# Patient Record
Sex: MALE | Race: OTHER | Employment: OTHER | URBAN - METROPOLITAN AREA
[De-identification: names, ages, dates, MRNs, and addresses within clinical notes are randomized per-mention and may not be internally consistent; named-entity substitution may affect disease eponyms.]

---

## 2018-07-25 NOTE — PATIENT DISCUSSION
"""S/P IOL OS: Tecwilliam  ZKB00 21 (ORA) +ORA/Femto +TM. Continue post operative instructions and drops per schedule.  """

## 2018-08-13 NOTE — PATIENT DISCUSSION
"""S/P IOL OD: Tecnis  ZKB00 21 +Femto +TM. Continue post operative instructions and drops per schedule.  """

## 2018-09-07 NOTE — PATIENT DISCUSSION
"""S/P IOL OU: OD: Tecnis  ZKB00 21Femto +TM. OS: Tecnis  ZKB00 21 (ORA) +ORA +TM. MRx given today. "

## 2018-12-10 NOTE — PATIENT DISCUSSION
"""recommend patient increase artificial tears to qid for dry sensation and recommend patient use ""

## 2023-04-12 ENCOUNTER — POST-OP (OUTPATIENT)
Dept: URBAN - METROPOLITAN AREA CLINIC 14 | Facility: CLINIC | Age: 63
End: 2023-04-12

## 2023-04-12 DIAGNOSIS — Z96.1: ICD-10-CM

## 2023-04-12 PROCEDURE — 99024NOCM NON COMANAGED POST OP CARE

## 2023-04-12 ASSESSMENT — KERATOMETRY
OS_K1POWER_DIOPTERS: 42.50
OD_AXISANGLE2_DEGREES: 3
OD_K1POWER_DIOPTERS: 42.50
OS_K2POWER_DIOPTERS: 43.75
OS_AXISANGLE_DEGREES: 154
OS_AXISANGLE2_DEGREES: 64
OD_K2POWER_DIOPTERS: 43.25
OD_AXISANGLE_DEGREES: 93

## 2023-04-12 ASSESSMENT — VISUAL ACUITY
OD_CC: 20/50
OS_PH: 20/30-1
OS_SC: 20/40
OD_BCVA: 20/40

## 2023-04-12 ASSESSMENT — TONOMETRY: OS_IOP_MMHG: 19

## 2023-04-26 ENCOUNTER — POST-OP (OUTPATIENT)
Dept: URBAN - METROPOLITAN AREA CLINIC 14 | Facility: CLINIC | Age: 63
End: 2023-04-26

## 2023-04-26 DIAGNOSIS — Z96.1: ICD-10-CM

## 2023-04-26 PROCEDURE — 99024NOCM NON COMANAGED POST OP CARE

## 2023-04-26 ASSESSMENT — TONOMETRY
OD_IOP_MMHG: 18
OS_IOP_MMHG: 19

## 2023-04-26 ASSESSMENT — VISUAL ACUITY
OD_SC: 20/25-1
OS_SC: 20/25